# Patient Record
Sex: FEMALE | Race: WHITE | URBAN - METROPOLITAN AREA
[De-identification: names, ages, dates, MRNs, and addresses within clinical notes are randomized per-mention and may not be internally consistent; named-entity substitution may affect disease eponyms.]

---

## 2023-10-20 ENCOUNTER — OFFICE VISIT (OUTPATIENT)
Dept: FAMILY MEDICINE CLINIC | Facility: CLINIC | Age: 33
End: 2023-10-20
Payer: COMMERCIAL

## 2023-10-20 ENCOUNTER — LAB ENCOUNTER (OUTPATIENT)
Dept: LAB | Age: 33
End: 2023-10-20
Attending: FAMILY MEDICINE

## 2023-10-20 VITALS
TEMPERATURE: 98 F | OXYGEN SATURATION: 97 % | HEIGHT: 67 IN | HEART RATE: 77 BPM | DIASTOLIC BLOOD PRESSURE: 61 MMHG | SYSTOLIC BLOOD PRESSURE: 97 MMHG | BODY MASS INDEX: 23.1 KG/M2 | WEIGHT: 147.19 LBS

## 2023-10-20 DIAGNOSIS — E03.9 HYPOTHYROIDISM, UNSPECIFIED TYPE: ICD-10-CM

## 2023-10-20 DIAGNOSIS — Z00.00 ROUTINE GENERAL MEDICAL EXAMINATION AT A HEALTH CARE FACILITY: Primary | ICD-10-CM

## 2023-10-20 DIAGNOSIS — G43.109 MIGRAINE WITH AURA AND WITHOUT STATUS MIGRAINOSUS, NOT INTRACTABLE: ICD-10-CM

## 2023-10-20 DIAGNOSIS — Z00.00 ROUTINE GENERAL MEDICAL EXAMINATION AT A HEALTH CARE FACILITY: ICD-10-CM

## 2023-10-20 LAB
ALBUMIN SERPL-MCNC: 4.2 G/DL (ref 3.4–5)
ALBUMIN/GLOB SERPL: 1.2 {RATIO} (ref 1–2)
ALP LIVER SERPL-CCNC: 81 U/L
ALT SERPL-CCNC: 26 U/L
ANION GAP SERPL CALC-SCNC: 6 MMOL/L (ref 0–18)
AST SERPL-CCNC: 16 U/L (ref 15–37)
BASOPHILS # BLD AUTO: 0.03 X10(3) UL (ref 0–0.2)
BASOPHILS NFR BLD AUTO: 0.5 %
BILIRUB SERPL-MCNC: 0.9 MG/DL (ref 0.1–2)
BUN BLD-MCNC: 14 MG/DL (ref 7–18)
BUN/CREAT SERPL: 21.5 (ref 10–20)
CALCIUM BLD-MCNC: 9.4 MG/DL (ref 8.5–10.1)
CHLORIDE SERPL-SCNC: 109 MMOL/L (ref 98–112)
CHOLEST SERPL-MCNC: 212 MG/DL (ref ?–200)
CO2 SERPL-SCNC: 28 MMOL/L (ref 21–32)
CREAT BLD-MCNC: 0.65 MG/DL
DEPRECATED RDW RBC AUTO: 44.1 FL (ref 35.1–46.3)
EGFRCR SERPLBLD CKD-EPI 2021: 121 ML/MIN/1.73M2 (ref 60–?)
EOSINOPHIL # BLD AUTO: 0.13 X10(3) UL (ref 0–0.7)
EOSINOPHIL NFR BLD AUTO: 2.4 %
ERYTHROCYTE [DISTWIDTH] IN BLOOD BY AUTOMATED COUNT: 12.1 % (ref 11–15)
EST. AVERAGE GLUCOSE BLD GHB EST-MCNC: 97 MG/DL (ref 68–126)
FASTING PATIENT LIPID ANSWER: YES
FASTING STATUS PATIENT QL REPORTED: YES
GLOBULIN PLAS-MCNC: 3.6 G/DL (ref 2.8–4.4)
GLUCOSE BLD-MCNC: 85 MG/DL (ref 70–99)
HBA1C MFR BLD: 5 % (ref ?–5.7)
HCT VFR BLD AUTO: 39.5 %
HDLC SERPL-MCNC: 103 MG/DL (ref 40–59)
HGB BLD-MCNC: 12.8 G/DL
IMM GRANULOCYTES # BLD AUTO: 0.01 X10(3) UL (ref 0–1)
IMM GRANULOCYTES NFR BLD: 0.2 %
LDLC SERPL CALC-MCNC: 103 MG/DL (ref ?–100)
LYMPHOCYTES # BLD AUTO: 2.15 X10(3) UL (ref 1–4)
LYMPHOCYTES NFR BLD AUTO: 39.1 %
MCH RBC QN AUTO: 32.2 PG (ref 26–34)
MCHC RBC AUTO-ENTMCNC: 32.4 G/DL (ref 31–37)
MCV RBC AUTO: 99.2 FL
MONOCYTES # BLD AUTO: 0.46 X10(3) UL (ref 0.1–1)
MONOCYTES NFR BLD AUTO: 8.4 %
NEUTROPHILS # BLD AUTO: 2.72 X10 (3) UL (ref 1.5–7.7)
NEUTROPHILS # BLD AUTO: 2.72 X10(3) UL (ref 1.5–7.7)
NEUTROPHILS NFR BLD AUTO: 49.4 %
NONHDLC SERPL-MCNC: 109 MG/DL (ref ?–130)
OSMOLALITY SERPL CALC.SUM OF ELEC: 296 MOSM/KG (ref 275–295)
PLATELET # BLD AUTO: 298 10(3)UL (ref 150–450)
POTASSIUM SERPL-SCNC: 3.8 MMOL/L (ref 3.5–5.1)
PROT SERPL-MCNC: 7.8 G/DL (ref 6.4–8.2)
RBC # BLD AUTO: 3.98 X10(6)UL
SODIUM SERPL-SCNC: 143 MMOL/L (ref 136–145)
TRIGL SERPL-MCNC: 33 MG/DL (ref 30–149)
VLDLC SERPL CALC-MCNC: 5 MG/DL (ref 0–30)
WBC # BLD AUTO: 5.5 X10(3) UL (ref 4–11)

## 2023-10-20 PROCEDURE — 80061 LIPID PANEL: CPT

## 2023-10-20 PROCEDURE — 36415 COLL VENOUS BLD VENIPUNCTURE: CPT

## 2023-10-20 PROCEDURE — 85025 COMPLETE CBC W/AUTO DIFF WBC: CPT

## 2023-10-20 PROCEDURE — 3078F DIAST BP <80 MM HG: CPT | Performed by: FAMILY MEDICINE

## 2023-10-20 PROCEDURE — 3074F SYST BP LT 130 MM HG: CPT | Performed by: FAMILY MEDICINE

## 2023-10-20 PROCEDURE — 83036 HEMOGLOBIN GLYCOSYLATED A1C: CPT

## 2023-10-20 PROCEDURE — 99395 PREV VISIT EST AGE 18-39: CPT | Performed by: FAMILY MEDICINE

## 2023-10-20 PROCEDURE — 3008F BODY MASS INDEX DOCD: CPT | Performed by: FAMILY MEDICINE

## 2023-10-20 PROCEDURE — 80053 COMPREHEN METABOLIC PANEL: CPT

## 2023-10-20 RX ORDER — LEVOTHYROXINE SODIUM 50 MCG
1 TABLET ORAL DAILY
COMMUNITY
Start: 2020-09-30

## 2023-10-20 RX ORDER — RIZATRIPTAN BENZOATE 5 MG/1
TABLET ORAL
COMMUNITY
Start: 2023-04-24

## 2023-10-30 ENCOUNTER — PATIENT MESSAGE (OUTPATIENT)
Dept: FAMILY MEDICINE CLINIC | Facility: CLINIC | Age: 33
End: 2023-10-30

## 2023-11-01 NOTE — TELEPHONE ENCOUNTER
From: Catie Scott  To: Chuck Rowland  Sent: 10/30/2023 2:21 PM CDT  Subject: Test Results from Amado Mann,    I received my test results from the recent lab work and wanted to see if there's anything I need to be aware of? Specifically, I saw my HDL cholesterol is high and my BUN/CREA Ratio and Calculated Osmolality are not in range, so please let me know what I need to do to get that in the proper range.     Thank you,  Lori Chang

## 2023-11-01 NOTE — TELEPHONE ENCOUNTER
Patient follow up on lab tests completed on 10/20/23. No result notes yet at this time. Please advise and thank you.

## 2023-11-07 NOTE — TELEPHONE ENCOUNTER
Chart reviewed. Patient already reviewed mychart result comments. Timeline: Your laboratory results are in an acceptable range. No significant issues at this time. The abnormalities seen are minor and not significant at this time. Follow up as discussed at the last appointment.    Written by Jeanette Marquis DO on 11/3/2023  2:01 PM CDT  Seen by patient Hieu Eid on 11/3/2023  2:22 PM

## 2023-12-13 ENCOUNTER — MED REC SCAN ONLY (OUTPATIENT)
Dept: FAMILY MEDICINE CLINIC | Facility: CLINIC | Age: 33
End: 2023-12-13

## 2024-03-09 ENCOUNTER — APPOINTMENT (OUTPATIENT)
Dept: GENERAL RADIOLOGY | Age: 34
End: 2024-03-09
Attending: PHYSICIAN ASSISTANT
Payer: COMMERCIAL

## 2024-03-09 ENCOUNTER — HOSPITAL ENCOUNTER (OUTPATIENT)
Age: 34
Discharge: HOME | End: 2024-03-09
Payer: COMMERCIAL

## 2024-03-09 VITALS
OXYGEN SATURATION: 98 % | SYSTOLIC BLOOD PRESSURE: 106 MMHG | DIASTOLIC BLOOD PRESSURE: 63 MMHG | TEMPERATURE: 97 F | RESPIRATION RATE: 16 BRPM | HEART RATE: 84 BPM

## 2024-03-09 DIAGNOSIS — R05.1 ACUTE COUGH: Primary | ICD-10-CM

## 2024-03-09 LAB
ATRIAL RATE: 55 BPM
B-HCG UR QL: NEGATIVE
P AXIS: 59 DEGREES
P-R INTERVAL: 178 MS
Q-T INTERVAL: 428 MS
QRS DURATION: 100 MS
QTC CALCULATION (BEZET): 409 MS
R AXIS: 39 DEGREES
T AXIS: 39 DEGREES
VENTRICULAR RATE: 55 BPM

## 2024-03-09 PROCEDURE — 71046 X-RAY EXAM CHEST 2 VIEWS: CPT | Performed by: PHYSICIAN ASSISTANT

## 2024-03-09 PROCEDURE — 81025 URINE PREGNANCY TEST: CPT | Performed by: PHYSICIAN ASSISTANT

## 2024-03-09 PROCEDURE — 93000 ELECTROCARDIOGRAM COMPLETE: CPT | Performed by: PHYSICIAN ASSISTANT

## 2024-03-09 PROCEDURE — 99204 OFFICE O/P NEW MOD 45 MIN: CPT | Performed by: PHYSICIAN ASSISTANT

## 2024-03-09 NOTE — DISCHARGE INSTRUCTIONS
Follow up with your primary care provider for further evaluation and management of your symptoms. Seek immediate medical attention if any chest pain, shortness of breath, palpitations, abdominal pain, vomiting, fever, dizziness or any other concerning symptoms.

## 2024-03-09 NOTE — ED INITIAL ASSESSMENT (HPI)
Pt presents with cough and congestion x 1 week. Pt reports \"stomach bug 3 days ago lasting 24 hours\". Pt reports no further stomach upset.     No fever reported.

## 2024-03-09 NOTE — ED PROVIDER NOTES
Patient Seen in: [facility]      History     Chief Complaint   Patient presents with    Cough/URI     Stated Complaint: Cough    Subjective:   HPI    Pt pmh VWD, hypothyroidism with mild nasal congestion x 1 week. Initially dry, now productive cough. Pleuritic pain x 1 week. Describes pain as discomfort under ribcage that lasts several seconds only with deep breathing. Not associated with exertion or activity.  3 days ago - pt reports 24 hours of diarrhea, 1 episode of emesis.  Diarrhea resolved. Decreased appetite persists. No fevers, chest pain, shortness of breath, palpitations, abdominal pain, back pain, extremity pain/swelling. Not a smoker.  No recent surgeries, travel, known cancer, known clotting disorders for herself or her family, no personal/family hx of cardiac disease at a young age.  Not on hormones. Sick contact with URI symptoms at home.       Objective:   Past Medical History:   Diagnosis Date    Hypothyroid     Von Willebrand disease (HCC)     pre DX of von willebrand disease              Past Surgical History:   Procedure Laterality Date    OTHER SURGICAL HISTORY      anoid removal    TONSILLECTOMY                  Social History     Socioeconomic History    Marital status:    Tobacco Use    Smoking status: Never     Passive exposure: Never    Smokeless tobacco: Never   Vaping Use    Vaping Use: Never used   Substance and Sexual Activity    Alcohol use: Yes    Drug use: Never              Review of Systems    Positive for stated complaint: Cough  Other systems are as noted in HPI.  Constitutional and vital signs reviewed.      All other systems reviewed and negative except as noted above.    Physical Exam     ED Triage Vitals [03/09/24 1102]   /63   Pulse 84   Resp 16   Temp 97.3 °F (36.3 °C)   Temp src Temporal   SpO2 98 %   O2 Device None (Room air)       Current:/63   Pulse 84   Temp 97.3 °F (36.3 °C) (Temporal)   Resp 16   SpO2 98%         Physical  Exam  Vitals and nursing note reviewed.   Constitutional:       General: She is not in acute distress.     Appearance: Normal appearance. She is not ill-appearing, toxic-appearing or diaphoretic.   HENT:      Head: Normocephalic.      Right Ear: Tympanic membrane normal.      Left Ear: Tympanic membrane normal.      Nose: Nose normal.      Mouth/Throat:      Mouth: Mucous membranes are moist.      Pharynx: Posterior oropharyngeal erythema present.   Eyes:      Conjunctiva/sclera: Conjunctivae normal.   Cardiovascular:      Rate and Rhythm: Normal rate.   Pulmonary:      Effort: Pulmonary effort is normal. No respiratory distress.      Breath sounds: Normal breath sounds. No wheezing.      Comments: +dry cough   Abdominal:      Comments: Soft, nontender. No peritoneal signs    Musculoskeletal:         General: No swelling or tenderness. Normal range of motion.      Cervical back: Normal range of motion.      Right lower leg: No edema.      Left lower leg: No edema.   Skin:     General: Skin is warm.   Neurological:      General: No focal deficit present.      Mental Status: She is alert.   Psychiatric:         Mood and Affect: Mood normal.         Behavior: Behavior normal.             ED Course     Labs Reviewed   POCT PREGNANCY URINE - Normal       EKG    Rate, intervals and axes as noted on EKG Report.  Rate: 55  Rhythm: Sinus bradycardia   Reading: Sinus bradycardia with no acute ST changes                 XR CHEST PA + LAT CHEST (PQS=23532)  Order: [id number]  Status: Final result       Visible to patient: Yes (not seen)       Next appt: None       Dx: Acute cough    0 Result Notes  Details    Reading Physician Reading Date Result Priority   Jeffery Ortega MD  [phone] 3/9/2024      Narrative  PROCEDURE: XR CHEST PA + LAT CHEST (CPT=71046)     COMPARISON: None.     INDICATIONS: Acute cough.     TECHNIQUE:   Two views.       FINDINGS:  CARDIAC/VASC: Normal.  No cardiac silhouette abnormality or  cardiomegaly.  Unremarkable pulmonary vasculature.    MEDIAST/BUBBA: No visible mass or adenopathy.  LUNGS/PLEURA: Normal.  No significant pulmonary parenchymal abnormalities.  No effusion or pleural thickening.    BONES: There is mild biconvex scoliosis to the visualized thoracolumbar spine.  OTHER: Negative.                Impression  CONCLUSION:  No acute cardiopulmonary process.           Dictated by (CST): Jeffery Ortega MD on 3/09/2024 at 12:23 PM      Finalized by (CST): Jeffery Ortega MD on 3/09/2024 at 12:23 PM           ED Course as of 03/09/24 1544  ------------------------------------------------------------  Time: 03/09 1542  Value: XR CHEST PA + LAT CHEST (DWQ=28519)  Comment: (Reviewed)            [facility]                                      Medical Decision Making    35 y/o F with URI symptoms. Sharp, short pleuritic discomfort in lower central rib cage x 1 week.      Differential PNA vs costochondritis vs muscle strain.    Afebrile, VSS. Nontoxic. Nondiaphoretic.  Do not suspect PE. Doubt ACS.  PERC negative. EKG without ischemic changes. CXR reviewed by me - negative for any acute process. Suspect pleuritic discomfort 2/2 URI sx and cough. Do not suspect any acute or life threatening process. Advised sx care. Close PCP follow up in 1-2 days for reevaluation for further evaluation/management if symptoms persist. ER precautions advised. All questions answered. Discussed case with Dr. Hudson - agreeable with management and dispo.             Disposition and Plan     Clinical Impression:  1. Acute cough         Disposition:  Discharge  3/9/2024  1:02 pm    Follow-up:  Rigo Joseph MD  [address] [place] [id number]  [phone]    In 1 day  Primary Care Provider    [facility] [address] [address]  [phone]              Medications Prescribed:  Discharge Medication List as of 3/9/2024  1:11 PM

## 2024-03-10 ENCOUNTER — TELEMEDICINE (OUTPATIENT)
Dept: FAMILY MEDICINE CLINIC | Facility: CLINIC | Age: 34
End: 2024-03-10
Payer: COMMERCIAL

## 2024-03-10 DIAGNOSIS — R09.81 NASAL CONGESTION: ICD-10-CM

## 2024-03-10 DIAGNOSIS — R05.1 ACUTE COUGH: Primary | ICD-10-CM

## 2024-03-10 DIAGNOSIS — J32.9 SINUSITIS, UNSPECIFIED CHRONICITY, UNSPECIFIED LOCATION: ICD-10-CM

## 2024-03-10 RX ORDER — AMOXICILLIN 875 MG/1
875 TABLET, COATED ORAL 2 TIMES DAILY
Qty: 20 TABLET | Refills: 0 | Status: SHIPPED | OUTPATIENT
Start: 2024-03-10 | End: 2024-03-20

## 2024-03-10 NOTE — PROGRESS NOTES
VIDEO VISIT PROGRESS NOTE (Non-Covid-19)  Todays date: 3/10/2024 10:13 AM        Most recent Nurse Triage message / Mychart message from patient:      Paged that she went to the walk-in clinic yesterday for ear infection but was not given any medications.  She states she is feeling worse.  I reviewed that note from the immediate care.    Due to the COVID-19 emergency implementation plan, this patient's visit was converted to a video visit as agreed upon with the patient.    Epic on-call video Check-In    Cinda Carbone verbally consents to a Video Check-In service on 03/10/24.  Patient understands and accepts financial responsibility for any deductible, co-insurance and/or co-pays associated with this service.  Patient call triage note reviewed.      I spoke to Cinda Carbone (or patient's family member/partner) by video, verified date of birth, and discussed current concerns.      If patient is a child then of course the parent or guardian will be giving the verbal consent for the video check-in and of course I will be speaking to this person for this visit.  Note, many times the patient however is nearby and I can hear the patient answering questions while the person on the video with me is talking to the patient.    This also goes for family members who would rather speak to me on behalf of their Pashto-speaking (or another foreign language) patient.  The patient will give consent but I will be speaking to the person that would be translating.  Also some of these patients with possible COVID-19 infection or some type of other illness are too ill to be on video and would rather have a designated person speak for them and in that case we will be speaking to that designated person and all parties involved understand the disclaimer that goes along with the consent for the video check-in service as stated above.        History of Present Illness:     From the note I saw from the immediate care it was a cough that  she was complaining of.  I did not see any ear pain complaint.    She states she has been coughing for more than a week.  She had congestion in her chest.  She been coughing up yellow mucus.  For the last 24 hours she is also had nasal congestion now with colored nasal mucus along feeling rundown.  She states in the middle of the night she had a twinge of ear pain but nothing significant.  There is no ear drainage.  She does complain of postnasal drip which she states was seen in the immediate care yesterday.  She is a bit concerned as this cough has been going on for more than a week and she is not feeling any better.        Duration of video along with documentation in minutes: 5 minutes and 6 seconds on video with an additional 7 minutes of chart review and of documentation        Wt Readings from Last 3 Encounters:   10/20/23 147 lb 3.2 oz (66.8 kg)       BMI Readings from Last 3 Encounters:   10/20/23 23.05 kg/m²       BP Readings from Last 3 Encounters:   03/09/24 106/63   10/20/23 97/61         Review of Systems   Constitutional:  Negative for fever.   HENT:  Positive for congestion, rhinorrhea and sore throat. Negative for ear discharge and ear pain.    Respiratory:  Positive for cough. Negative for chest tightness, shortness of breath and wheezing.    Cardiovascular:  Negative for chest pain.           Medical History:      Past Medical History:   Diagnosis Date    Hypothyroid     Von Willebrand disease (HCC)     pre DX of von willebrand disease       Past Surgical History:   Procedure Laterality Date    OTHER SURGICAL HISTORY      anoid removal    TONSILLECTOMY            Current Outpatient Medications   Medication Sig Dispense Refill    SYNTHROID 50 MCG Oral Tab Take 1 tablet (50 mcg total) by mouth daily.      PRENATAL CALCIUM-B6-FA-SAY OR       Rizatriptan Benzoate 5 MG Oral Tab take 1 tablet (5 mg) by oral route once, may repeat at 2 hour intervals; do not exceed 30 mg in 24 hours        Allergies:  Allergies   Allergen Reactions    Sumatriptan ANAPHYLAXIS, OTHER (SEE COMMENTS), SHORTNESS OF BREATH, SWELLING, Tightness in Throat and UNKNOWN     swelling of throat            Physical Exam:   Limited examination due to this being a video visit       Patient was speaking in complete sentences, no increased work of breathing and very coherent and alert on the phone.  Alert and oriented x 3. Patient appears well-developed and well-nourished. No distress.  Patient was responding to questions appropriately.  Patient did not sound short of breath.  Patient has a normal mood and affect.  She is standing taking care of her 2 children.  No swelling or redness of the face but she does sound nasally congested.  No diaphoresis or pallor from what I say.      Assessment / Plan:      Diagnoses and all orders for this visit:    Acute cough  -     amoxicillin 875 MG Oral Tab; Take 1 tablet (875 mg total) by mouth 2 (two) times daily for 10 days.  She does relate when she bends over she feels a lot of pressure in her face.  Will get a start her on amoxicillin for possible sinus infection.  Take as directed.  Follow-up with your PCP if not better.  Nasal congestion  -     amoxicillin 875 MG Oral Tab; Take 1 tablet (875 mg total) by mouth 2 (two) times daily for 10 days.    Sinusitis, unspecified chronicity, unspecified location  -     amoxicillin 875 MG Oral Tab; Take 1 tablet (875 mg total) by mouth 2 (two) times daily for 10 days.  Patient agrees to plan.            Medical History:         Reviewed Allergies:  Allergies   Allergen Reactions    Sumatriptan ANAPHYLAXIS, OTHER (SEE COMMENTS), SHORTNESS OF BREATH, SWELLING, Tightness in Throat and UNKNOWN     swelling of throat      Reviewed Past Medical History:  Past Medical History:   Diagnosis Date    Hypothyroid     Von Willebrand disease (HCC)     pre DX of von willebrand disease      Reviewed Family History:  Family History   Problem Relation Age of Onset     No Known Problems Father     No Known Problems Mother     No Known Problems Daughter     No Known Problems Son     No Known Problems Maternal Grandmother     No Known Problems Maternal Grandfather     No Known Problems Paternal Grandmother     No Known Problems Paternal Grandfather     No Known Problems Sister     No Known Problems Brother        Reviewed Social History:  Social History     Socioeconomic History    Marital status:    Tobacco Use    Smoking status: Never     Passive exposure: Never    Smokeless tobacco: Never   Vaping Use    Vaping Use: Never used   Substance and Sexual Activity    Alcohol use: Yes    Drug use: Never      Reviewed Current Medications:  Current Outpatient Medications   Medication Sig Dispense Refill    SYNTHROID 50 MCG Oral Tab Take 1 tablet (50 mcg total) by mouth daily.      PRENATAL CALCIUM-B6-FA-SAY OR       Rizatriptan Benzoate 5 MG Oral Tab take 1 tablet (5 mg) by oral route once, may repeat at 2 hour intervals; do not exceed 30 mg in 24 hours              Cinda Carbone advised to follow CDC guidelines for self isolation and symptomatic treatment as outlined on CDC Patient Guidelines. Cinda Carbone understands video evaluation is not a substitute for face-to-face examination or emergency care. Patient advised to go to ER or call 911 for worsening symptoms or acute distress. (NOTE: Not every complaint above will be related to the COVID-19 pandemic).    Please note that the following visit was completed using two-way, real-time interactive audio and/or video communication.  This has been done in good cierra to provide continuity of care in the best interest of the provider-patient relationship, due to the ongoing public health crisis/national emergency and because of restrictions of visitation.  There are limitations of this visit as no physical exam could be performed.  Every conscious effort was taken to allow for sufficient and adequate time.  This billing was  spent on reviewing labs, medications, radiology tests and decision making.  Appropriate medical decision-making and tests are ordered as detailed in the plan of care above.    Demetrius Gómez,   3/10/2024

## 2024-03-18 ENCOUNTER — TELEPHONE (OUTPATIENT)
Dept: FAMILY MEDICINE CLINIC | Facility: CLINIC | Age: 34
End: 2024-03-18

## 2024-03-18 NOTE — TELEPHONE ENCOUNTER
Pt had televisit  3/10/24, still have congestion, abx not completed, advised to continue abx, stay hydrated, increase vit c IT, call back if s/s are worse, verbalized understanding

## 2024-06-10 ENCOUNTER — PATIENT MESSAGE (OUTPATIENT)
Dept: FAMILY MEDICINE CLINIC | Facility: CLINIC | Age: 34
End: 2024-06-10

## 2024-09-11 ENCOUNTER — PATIENT MESSAGE (OUTPATIENT)
Dept: FAMILY MEDICINE CLINIC | Facility: CLINIC | Age: 34
End: 2024-09-11

## 2024-09-12 NOTE — TELEPHONE ENCOUNTER
Jay April, RN 9/12/2024 8:13 AM CDT        ----- Message -----  From: Cinda Carbone  Sent: 9/11/2024 2:11 PM CDT  To: Em Rn Triage  Subject: Medical Records     Hello, my previous medical providers sent my past records to this office, and I need you to send them to me (dates should be from [id number]). I'm only able to view records from my visits to [facility] ([id number]).    Please let me know what information you need in order to send them directly to me, so I avoid any issues like this with future providers.    Thanks,